# Patient Record
(demographics unavailable — no encounter records)

---

## 2024-11-20 NOTE — PHYSICAL EXAM
[de-identified] : The patient appears well nourished  and in no apparent distress.  The patient is alert and oriented to person, place, and time.   Affect and mood appear normal.    The head is normocephalic and atraumatic.  The eyes reveal normal sclera and extra ocular muscles are intact.   The neck appears normal with no jugular venous distention or masses noted.   Skin shows normal turgor with no evidence of eczema or psoriasis.  No respiratory distress noted.  The patient ambulates with an antalgic gait.  The right knee has mild loss of range of motion.  Crepitation is noted with motion.  Tenderness about the medial lateral joints.  Slight effusion noted..   There is a negative Belen sign.  There is no soft tissue swelling, warmth, or erythema.   There is a negative Lachman sign and negative anterior/posterior drawer.  Negative pivot shift test.  There is no instability to varus/valgus stress.    There is normal strength  in the quadriceps and hamstring muscles.  Strength and sensation are intact distally.  There are normal pulses distally and good capillary refill.  No edema or lymphadenopathy noted.    The left knee has satisfactory range of motion.  Mild pain noted with terminal motion.  There is no pain with range of motion.  There is no effusion.  There is no joint line tenderness .   There is a negative Belen sign.  There is no soft tissue swelling, warmth, or erythema.   There is a negative Lachman sign and negative anterior/posterior drawer.  Negative pivot shift test.  There is no instability to varus/valgus stress.    There is normal strength  in the quadriceps and hamstring muscles.  Strength and sensation are intact distally.  There are normal pulses distally and good capillary refill.  No edema or lymphadenopathy noted.    [de-identified] : AP, lateral, tunnel, and merchant views of the right and left knees were obtained.  There is moderate patellofemoral narrowing of both knees left worse than the right.  There is also moderate bordering on severe narrowing of the lateral joint to the left knee.  Moderate narrowing of the lateral joint of the right knee noted.  The alignment of the knee is normal.  No fractures or dislocations are noted.

## 2024-11-20 NOTE — HISTORY OF PRESENT ILLNESS
[de-identified] : This patient presents today with complaints of bilateral knee pain right worse than left.  She had gel injections into the right knee about 2 years ago which gave her some improvement.  She also had gel 1 in the left knee in March of this year and the left knee is doing quite well.  She has occasional pain in the left knee.  Most the pain is in the right knee.  Pain in the morning when getting up from a seated position and also when ambulating and climbing stairs.  Pain is improved with rest.  Denies radicular symptoms or numbness and tingling.  Currently not taking any NSAIDs or pain medicine.

## 2024-11-20 NOTE — DISCUSSION/SUMMARY
[de-identified] : This patient presents today for evaluation regarding bilateral knee pain right worse than left.  Her physical exam and x-rays reveals evidence of moderate arthritis in the left knee and moderate to severe in the right knee.  We discussed treatment options.  She has done well with viscosupplementation in the past.  Recommend another course of viscosupplementation.  Today's visit we injected the first dose of Euflexxa to the right knee under sterile conditions.  Instructions are given postinjection for ice analgesics and modification of activities we will see the patient back in 1 week for the next injection.  At least 40 minutes was spent performing the evaluation and management on today's office visit.  This includes but is not limited to preparing to see patient including review of any test results or outside medical records, obtaining and/or reviewing separately obtained history, performing examination and evaluation, counseling and educating the patient on their diagnosis and treatment recommendations, ordering medications, tests, or procedures, documenting clinical information in the electronic health record, independently interpreting results (not separately reported) and communicating results to the patient, and coordination of care.

## 2024-11-20 NOTE — PROCEDURE
[de-identified] : A 22gauge needle was sterilely placed onto the syringe of Euflexxa the right knee was then sterilely prepped with chlorhexidine and ethylene chloride spray was used as an anesthetic just prior to the injection.  Under ultrasound guidance utilizing the [employer] Lumify ultrasound probe the Euflexxa was injected into the knee joint just above and lateral to the patella into the suprapatellar pouch.  Placement of the injection into the suprapatellar pouch was confirmed by ultrasound and a spot image was saved.  The patient tolerated the procedure well without difficulty.  The patient was given instructions on the use of ice and anti-inflammatories for post injection site soreness.

## 2024-11-27 NOTE — PROCEDURE
[de-identified] : A 22gauge needle was sterilely placed onto the syringe of Euflexxa the right knee was then sterilely prepped with chlorhexidine and ethylene chloride spray was used as an anesthetic just prior to the injection.  Under ultrasound guidance utilizing the [employer] Lumify ultrasound probe the Euflexxa was injected into the knee joint just above and lateral to the patella into the suprapatellar pouch.  Placement of the injection into the suprapatellar pouch was confirmed by ultrasound and a spot image was saved.  The patient tolerated the procedure well without difficulty.  The patient was given instructions on the use of ice and anti-inflammatories for post injection site soreness.

## 2024-11-27 NOTE — PHYSICAL EXAM
[de-identified] : The patient appears well nourished  and in no apparent distress.  The patient is alert and oriented to person, place, and time.   Affect and mood appear normal.    The head is normocephalic and atraumatic.  The eyes reveal normal sclera and extra ocular muscles are intact.   The neck appears normal with no jugular venous distention or masses noted.   Skin shows normal turgor with no evidence of eczema or psoriasis.  No respiratory distress noted.  The patient ambulates with an antalgic gait.  The right knee has mild loss of range of motion.  Crepitation is noted with motion.  Tenderness about the medial lateral joints.  Slight effusion noted..   There is a negative Belen sign.  There is no soft tissue swelling, warmth, or erythema.   There is a negative Lachman sign and negative anterior/posterior drawer.  Negative pivot shift test.  There is no instability to varus/valgus stress.    There is normal strength  in the quadriceps and hamstring muscles.  Strength and sensation are intact distally.  There are normal pulses distally and good capillary refill.  No edema or lymphadenopathy noted.    The left knee has satisfactory range of motion.  Mild pain noted with terminal motion.  There is no pain with range of motion.  There is no effusion.  There is no joint line tenderness .   There is a negative Belen sign.  There is no soft tissue swelling, warmth, or erythema.   There is a negative Lachman sign and negative anterior/posterior drawer.  Negative pivot shift test.  There is no instability to varus/valgus stress.    There is normal strength  in the quadriceps and hamstring muscles.  Strength and sensation are intact distally.  There are normal pulses distally and good capillary refill.  No edema or lymphadenopathy noted.

## 2024-11-27 NOTE — DISCUSSION/SUMMARY
[de-identified] : On todays visit we injected the  dose of hyaluronic acid without difficulty.  Instructions were given postinjection regarding ice, analgesics, and modification of activities.  I will see the patient back in one week for the next injection.

## 2024-11-27 NOTE — HISTORY OF PRESENT ILLNESS
[de-identified] : This patient presents today for follow-up regarding osteoarthritis about the right knee.  She presents today for the second Euflexxa injection.  She had the first injection with significant improvement.  She no longer has any pain in the right knee.  No adverse effects noted after the last injection either.

## 2024-11-27 NOTE — REASON FOR VISIT
[Follow-Up Visit] : a follow-up visit for [FreeTextEntry2] : primary localized osteoarthritis of right knee here for Euflexxa #2

## 2024-12-04 NOTE — PROCEDURE
[de-identified] : A 22gauge needle was sterilely placed onto the syringe of Euflexxa the right knee was then sterilely prepped with chlorhexidine and ethylene chloride spray was used as an anesthetic just prior to the injection.  Under ultrasound guidance utilizing the [employer] Lumify ultrasound probe the Euflexxa was injected into the knee joint just above and lateral to the patella into the suprapatellar pouch.  Placement of the injection into the suprapatellar pouch was confirmed by ultrasound and a spot image was saved.  The patient tolerated the procedure well without difficulty.  The patient was given instructions on the use of ice and anti-inflammatories for post injection site soreness.

## 2024-12-04 NOTE — HISTORY OF PRESENT ILLNESS
[de-identified] : This patient presents for follow-up primary osteoarthritis about the right knee.  Currently has no pain about the knee.  She presents for the third Euflexxa injection.  She notes some stiffness in the morning but pain is doing much better.  She has pain when she goes from sitting to a standing position and when she ambulates and climbs stairs.  Pain is improved with rest and Tylenol.

## 2024-12-04 NOTE — DISCUSSION/SUMMARY
[de-identified] : On today's visit we injected the last dose of hyaluronic acid to the knee under sterile conditions.  Instructions were given to the patient regarding postinjection pain for ice, analgesics, and modification of activities.  I will see the patient back in the office if they have recurrence of symptoms.

## 2024-12-04 NOTE — REASON FOR VISIT
[Follow-Up Visit] : a follow-up visit for [FreeTextEntry2] : Euflexxa injection #3 into the right knee.

## 2024-12-04 NOTE — PHYSICAL EXAM
[de-identified] : The patient appears well nourished  and in no apparent distress.  The patient is alert and oriented to person, place, and time.   Affect and mood appear normal.    The head is normocephalic and atraumatic.  The eyes reveal normal sclera and extra ocular muscles are intact.   The neck appears normal with no jugular venous distention or masses noted.   Skin shows normal turgor with no evidence of eczema or psoriasis.  No respiratory distress noted.  The patient ambulates with an antalgic gait.  The right knee has mild loss of range of motion.  Crepitation is noted with motion.  Tenderness about the medial lateral joints.  Slight effusion noted..   There is a negative Belen sign.  There is no soft tissue swelling, warmth, or erythema.   There is a negative Lachman sign and negative anterior/posterior drawer.  Negative pivot shift test.  There is no instability to varus/valgus stress.    There is normal strength  in the quadriceps and hamstring muscles.  Strength and sensation are intact distally.  There are normal pulses distally and good capillary refill.  No edema or lymphadenopathy noted.    The left knee has satisfactory range of motion.  Mild pain noted with terminal motion.  There is no pain with range of motion.  There is no effusion.  There is no joint line tenderness .   There is a negative Belen sign.  There is no soft tissue swelling, warmth, or erythema.   There is a negative Lachman sign and negative anterior/posterior drawer.  Negative pivot shift test.  There is no instability to varus/valgus stress.    There is normal strength  in the quadriceps and hamstring muscles.  Strength and sensation are intact distally.  There are normal pulses distally and good capillary refill.  No edema or lymphadenopathy noted.

## 2025-03-19 NOTE — REASON FOR VISIT
[Follow-Up Visit] : a follow-up visit for [FreeTextEntry2] : Primary localized osteoarthritis of left knee

## 2025-03-19 NOTE — PROCEDURE
[de-identified] : A 22gauge needle was sterilely placed onto the syringe of Euflexxa.  The  left knee was then sterilely prepped with chlorhexidine and ethylene chloride spray was used as an anesthetic just prior to the injection.  Under ultrasound guidance utilizing the [employer] Lumify ultrasound probe the Euflexxa was injected into left knee joint just above and lateral to the patella into the suprapatellar pouch.  Placement of the injection into the suprapatellar pouch was confirmed by ultrasound and a spot image was saved.  The patient tolerated the procedure well without difficulty.  The patient was given instructions on the use of ice and anti-inflammatories for post injection site soreness.

## 2025-03-19 NOTE — PHYSICAL EXAM
[de-identified] : The patient appears well nourished  and in no apparent distress.  The patient is alert and oriented to person, place, and time.   Affect and mood appear normal.    The head is normocephalic and atraumatic.  The eyes reveal normal sclera and extra ocular muscles are intact.   The neck appears normal with no jugular venous distention or masses noted.   Skin shows normal turgor with no evidence of eczema or psoriasis.  No respiratory distress noted.  The patient ambulates with an antalgic gait.  The left knee has satisfactory range of motion.  Mild pain noted with terminal motion.  There is no pain with range of motion.  There is no effusion.  There is no joint line tenderness .   There is a negative [facility] sign.  There is no soft tissue swelling, warmth, or erythema.   There is a negative Lachman sign and negative anterior/posterior drawer.  Negative pivot shift test.  There is no instability to varus/valgus stress.    There is normal strength  in the quadriceps and hamstring muscles.  Strength and sensation are intact distally.  There are normal pulses distally and good capillary refill.  No edema or lymphadenopathy noted.

## 2025-03-19 NOTE — DISCUSSION/SUMMARY
[de-identified] : This patient presents today for follow-up regarding osteoarthritis about the knees.  The right knee is doing much better after viscosupplementation in November of last year.  The left knee continues to bother her.  We discussed treatment options with regards to the left knee.  She did do well with viscosupplementation in the past but I recommended subsequent course of viscosupplementation.  Today's visit we injected first dose of Euflexxa to the left knee under sterile conditions.  Instructions are given postinjection for ice analgesics and modification of activities.  Will see her back in 1 week for the next injection.  At least 30 minutes was spent performing the evaluation and management on today's office visit.  This includes but is not limited to preparing to see patient including review of any test results or outside medical records, obtaining and/or reviewing separately obtained history, performing examination and evaluation, counseling and educating the patient on their diagnosis and treatment recommendations, ordering medications, tests, or procedures, documenting clinical information in the electronic health record, independently interpreting results (not separately reported) and communicating results to the patient, and coordination of care.

## 2025-03-19 NOTE — HISTORY OF PRESENT ILLNESS
[de-identified] : This patient presents today for evaluation consultation regarding complaints of worsening left knee pain.  She did have viscosupplementation into the right knee in November of last year with a good result.  She notes continued pain about the left knee and has not had any viscosupplementation in quite some time.  She is noting continued pain about the left knee with ambulation and stair climbing.  Pain is improved with rest.  Currently not taking any NSAIDs or pain medicine.

## 2025-03-26 NOTE — PHYSICAL EXAM
[de-identified] : The patient appears well nourished  and in no apparent distress.  The patient is alert and oriented to person, place, and time.   Affect and mood appear normal.    The head is normocephalic and atraumatic.  The eyes reveal normal sclera and extra ocular muscles are intact.   The neck appears normal with no jugular venous distention or masses noted.   Skin shows normal turgor with no evidence of eczema or psoriasis.  No respiratory distress noted.  The patient ambulates with an antalgic gait.  The left knee has satisfactory range of motion.  Mild pain noted with terminal motion.  There is no pain with range of motion.  There is no effusion.  There is no joint line tenderness .   There is a negative [facility] sign.  There is no soft tissue swelling, warmth, or erythema.   There is a negative Lachman sign and negative anterior/posterior drawer.  Negative pivot shift test.  There is no instability to varus/valgus stress.    There is normal strength  in the quadriceps and hamstring muscles.  Strength and sensation are intact distally.  There are normal pulses distally and good capillary refill.  No edema or lymphadenopathy noted.

## 2025-03-26 NOTE — PROCEDURE
[de-identified] : A 22gauge needle was sterilely placed onto the syringe of Euflexxa.  The  left knee was then sterilely prepped with chlorhexidine and ethylene chloride spray was used as an anesthetic just prior to the injection.  Under ultrasound guidance utilizing the [employer] Lumify ultrasound probe the Euflexxa was injected into left knee joint just above and lateral to the patella into the suprapatellar pouch.  Placement of the injection into the suprapatellar pouch was confirmed by ultrasound and a spot image was saved.  The patient tolerated the procedure well without difficulty.  The patient was given instructions on the use of ice and anti-inflammatories for post injection site soreness.

## 2025-03-26 NOTE — HISTORY OF PRESENT ILLNESS
[de-identified] : This patient presents for the second Euflexxa injection to the left knee.  Patient is noting no significant change in her symptoms thus far.  Pain level minimal presently.  No adverse effects noted after the last injection.

## 2025-03-26 NOTE — DISCUSSION/SUMMARY
[de-identified] : On todays visit we injected the  dose of hyaluronic acid without difficulty.  Instructions were given postinjection regarding ice, analgesics, and modification of activities.  I will see the patient back in one week for the next injection.

## 2025-04-02 NOTE — PROCEDURE
[de-identified] : A 22gauge needle was sterilely placed onto the syringe of Euflexxa.  The  left knee was then sterilely prepped with chlorhexidine and ethylene chloride spray was used as an anesthetic just prior to the injection.  Under ultrasound guidance utilizing the [employer] Lumify ultrasound probe the Euflexxa was injected into left knee joint just above and lateral to the patella into the suprapatellar pouch.  Placement of the injection into the suprapatellar pouch was confirmed by ultrasound and a spot image was saved.  The patient tolerated the procedure well without difficulty.  The patient was given instructions on the use of ice and anti-inflammatories for post injection site soreness.

## 2025-04-02 NOTE — HISTORY OF PRESENT ILLNESS
[de-identified] : This patient presents today for evaluation regarding left knee osteoarthritis.  Patient presents for the third Euflexxa injection.  She had the first 2 injections without difficulty.  Current pain level 1 out of 10.  No adverse effects noted after the last injection.

## 2025-04-02 NOTE — REASON FOR VISIT
[Follow-Up Visit] : a follow-up visit for [FreeTextEntry2] : Euflexxa injecting #3 into the left knee.

## 2025-04-02 NOTE — PHYSICAL EXAM
[de-identified] : The patient appears well nourished  and in no apparent distress.  The patient is alert and oriented to person, place, and time.   Affect and mood appear normal.    The head is normocephalic and atraumatic.  The eyes reveal normal sclera and extra ocular muscles are intact.   The neck appears normal with no jugular venous distention or masses noted.   Skin shows normal turgor with no evidence of eczema or psoriasis.  No respiratory distress noted.  The patient ambulates with an antalgic gait.  The left knee has satisfactory range of motion.  Mild pain noted with terminal motion.  There is no pain with range of motion.  There is no effusion.  There is no joint line tenderness .   There is a negative [facility] sign.  There is no soft tissue swelling, warmth, or erythema.   There is a negative Lachman sign and negative anterior/posterior drawer.  Negative pivot shift test.  There is no instability to varus/valgus stress.    There is normal strength  in the quadriceps and hamstring muscles.  Strength and sensation are intact distally.  There are normal pulses distally and good capillary refill.  No edema or lymphadenopathy noted.

## 2025-04-02 NOTE — DISCUSSION/SUMMARY
[de-identified] : On today's visit we injected the last dose of hyaluronic acid to the knee under sterile conditions.  Instructions were given to the patient regarding postinjection pain for ice, analgesics, and modification of activities.  I will see the patient back in the office if they have recurrence of symptoms.